# Patient Record
Sex: FEMALE | Race: WHITE | Employment: FULL TIME | URBAN - METROPOLITAN AREA
[De-identification: names, ages, dates, MRNs, and addresses within clinical notes are randomized per-mention and may not be internally consistent; named-entity substitution may affect disease eponyms.]

---

## 2022-01-19 ENCOUNTER — HOSPITAL ENCOUNTER (OUTPATIENT)
Facility: HOSPITAL | Age: 57
End: 2022-01-19
Attending: ORTHOPAEDIC SURGERY | Admitting: ORTHOPAEDIC SURGERY

## 2022-02-09 DIAGNOSIS — Z11.59 ENCOUNTER FOR SCREENING FOR OTHER VIRAL DISEASES: Primary | ICD-10-CM

## 2022-03-04 ENCOUNTER — OFFICE VISIT (OUTPATIENT)
Dept: FAMILY MEDICINE | Facility: OTHER | Age: 57
End: 2022-03-04
Attending: SPECIALIST
Payer: COMMERCIAL

## 2022-03-04 DIAGNOSIS — Z11.59 ENCOUNTER FOR SCREENING FOR OTHER VIRAL DISEASES: ICD-10-CM

## 2022-03-04 PROCEDURE — U0003 INFECTIOUS AGENT DETECTION BY NUCLEIC ACID (DNA OR RNA); SEVERE ACUTE RESPIRATORY SYNDROME CORONAVIRUS 2 (SARS-COV-2) (CORONAVIRUS DISEASE [COVID-19]), AMPLIFIED PROBE TECHNIQUE, MAKING USE OF HIGH THROUGHPUT TECHNOLOGIES AS DESCRIBED BY CMS-2020-01-R: HCPCS

## 2022-03-04 PROCEDURE — U0005 INFEC AGEN DETEC AMPLI PROBE: HCPCS

## 2022-03-05 LAB — SARS-COV-2 RNA RESP QL NAA+PROBE: NEGATIVE

## 2022-03-09 ENCOUNTER — OFFICE VISIT (OUTPATIENT)
Dept: ORTHOPEDICS | Facility: OTHER | Age: 57
End: 2022-03-09
Attending: SPECIALIST
Payer: COMMERCIAL

## 2022-03-09 ENCOUNTER — ANCILLARY PROCEDURE (OUTPATIENT)
Dept: GENERAL RADIOLOGY | Facility: OTHER | Age: 57
End: 2022-03-09
Attending: SPECIALIST
Payer: COMMERCIAL

## 2022-03-09 VITALS
WEIGHT: 222 LBS | HEIGHT: 66 IN | DIASTOLIC BLOOD PRESSURE: 56 MMHG | OXYGEN SATURATION: 99 % | HEART RATE: 77 BPM | BODY MASS INDEX: 35.68 KG/M2 | SYSTOLIC BLOOD PRESSURE: 112 MMHG | RESPIRATION RATE: 16 BRPM

## 2022-03-09 DIAGNOSIS — Z96.642 STATUS POST TOTAL REPLACEMENT OF LEFT HIP: Primary | ICD-10-CM

## 2022-03-09 DIAGNOSIS — Z47.1 AFTERCARE FOLLOWING JOINT REPLACEMENT: ICD-10-CM

## 2022-03-09 PROCEDURE — 73502 X-RAY EXAM HIP UNI 2-3 VIEWS: CPT | Mod: TC | Performed by: RADIOLOGY

## 2022-03-09 PROCEDURE — 99024 POSTOP FOLLOW-UP VISIT: CPT | Performed by: SPECIALIST

## 2022-03-09 RX ORDER — METOPROLOL SUCCINATE 100 MG/1
TABLET, EXTENDED RELEASE ORAL
COMMUNITY
Start: 2021-11-08

## 2022-03-09 RX ORDER — HYDROCHLOROTHIAZIDE 12.5 MG/1
TABLET ORAL
COMMUNITY
Start: 2021-11-02

## 2022-03-09 RX ORDER — IBUPROFEN 600 MG/1
600 TABLET, FILM COATED ORAL EVERY 6 HOURS PRN
COMMUNITY

## 2022-03-09 RX ORDER — ROSUVASTATIN CALCIUM 10 MG/1
TABLET, COATED ORAL
COMMUNITY
Start: 2021-10-18

## 2022-03-09 RX ORDER — OXYCODONE AND ACETAMINOPHEN 5; 325 MG/1; MG/1
1 TABLET ORAL EVERY 6 HOURS PRN
COMMUNITY

## 2022-03-09 RX ORDER — ACETAMINOPHEN 500 MG
500-1000 TABLET ORAL EVERY 6 HOURS PRN
COMMUNITY

## 2022-03-09 ASSESSMENT — PAIN SCALES - GENERAL: PAINLEVEL: EXTREME PAIN (8)

## 2022-03-09 NOTE — PROGRESS NOTES
Service Date: 03/09/2022    The patient is seen today for followup.  She is 1 day postop from a left total hip arthroplasty.  She was concerned when she went home because she was trying to put her shoe on.  She was trying to extend or flex her left leg forward to put her shoe on and felt a pop anteriorly in her hip.  She complained of pain and was concerned.  She also noticed that when she stands, she feels like her left leg is a little bit longer.  She was concerned and decided to come in today.  She called to talk to my PA and he recommended that she come in today.  We made a space available for this afternoon.  She is here complaining of some left anterior hip pain.  She feels that the block is probably starting to wear off and maybe that is why she is feeling more pain as well.      EXAMINATION:  On today's exam, the patient has clean, dry dressing.  She has Aquacel in place, it looks good.  She has minimal pain with range of motion of her hip.  She has a difficult time flexing her hip.  She has some pain anteriorly in the hip.  She can extend her knee fully with flexion of the knee.  On gentle internal and external rotation of the hip, she has no real pain.  She has no pain laterally over the trochanter.  She is able to abduct her hip while standing without much difficulty.  She probably has a little bit of a leg length discrepancy.      IMAGING:  We did get x-rays today, an AP pelvis and lateral view of the hip, which show a very well-positioned total hip arthroplasty with no evidence of fracture or complications.  There might be a few millimeters of leg length discrepancy with the left side being a little bit longer than the right, but this looks fairly minimal.  The cup position looked good and the cup and femur components look stable.    ASSESSMENT AND PLAN:  I think the x-ray looks good.  I do not see any evidence of complications.  I am not sure exactly where she felt when she was trying to get her shoes  on.  I recommended that we treat conservatively for now, have her continue to use her walker and weightbear as tolerated with the walker.  She will continue to take her pain medication as needed.  I will follow up with her as planned on  and we will remove the Aquacel dressing and removed the staples at that time.      She inquired about going back to work in 2 weeks.  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  We decided that we would wait until her 2-week followup appointment to determine whether she would be able to go back to work at that time, but I have cautioned her that she might need to be off for a little bit longer.    Louie Noguera MD        D: 2022   T: 2022   MT: [id number]    Name:     BILLY LITTLE  MRN:      [id number]        Account:      [id number]   :      1965           Service Date: 2022       Document: [id number]

## 2022-03-09 NOTE — NURSING NOTE
"Chief Complaint   Patient presents with     Surgical Followup     S/P total hip 03/08/2022       Initial /56   Pulse 77   Resp 16   Ht 1.676 m (5' 6\")   Wt 100.7 kg (222 lb)   SpO2 99%   BMI 35.83 kg/m   Estimated body mass index is 35.83 kg/m  as calculated from the following:    Height as of this encounter: 1.676 m (5' 6\").    Weight as of this encounter: 100.7 kg (222 lb).  Medication Reconciliation: complete  Benita Espino LPN    "